# Patient Record
Sex: MALE | Race: WHITE | URBAN - METROPOLITAN AREA
[De-identification: names, ages, dates, MRNs, and addresses within clinical notes are randomized per-mention and may not be internally consistent; named-entity substitution may affect disease eponyms.]

---

## 2017-04-18 VITALS
BODY MASS INDEX: 25.28 KG/M2 | DIASTOLIC BLOOD PRESSURE: 67 MMHG | HEART RATE: 83 BPM | OXYGEN SATURATION: 94 % | SYSTOLIC BLOOD PRESSURE: 116 MMHG | RESPIRATION RATE: 18 BRPM | WEIGHT: 156.6 LBS | TEMPERATURE: 97.7 F

## 2017-04-18 RX ORDER — METRONIDAZOLE 10 MG/G
GEL TOPICAL DAILY PRN
COMMUNITY

## 2017-04-18 RX ORDER — LORATADINE 10 MG/1
10 TABLET ORAL DAILY
COMMUNITY

## 2017-04-18 RX ORDER — GUAIFENESIN 600 MG/1
1200 TABLET, EXTENDED RELEASE ORAL 2 TIMES DAILY
COMMUNITY
End: 2017-04-24

## 2017-04-19 ENCOUNTER — NURSING HOME VISIT (OUTPATIENT)
Dept: GERIATRICS | Facility: CLINIC | Age: 82
End: 2017-04-19
Payer: COMMERCIAL

## 2017-04-19 DIAGNOSIS — F03.90 DEMENTIA WITHOUT BEHAVIORAL DISTURBANCE, UNSPECIFIED DEMENTIA TYPE: ICD-10-CM

## 2017-04-19 DIAGNOSIS — K59.01 SLOW TRANSIT CONSTIPATION: ICD-10-CM

## 2017-04-19 DIAGNOSIS — I50.9 CHRONIC CONGESTIVE HEART FAILURE, UNSPECIFIED CONGESTIVE HEART FAILURE TYPE: ICD-10-CM

## 2017-04-19 DIAGNOSIS — J10.1 INFLUENZA B: Primary | ICD-10-CM

## 2017-04-19 DIAGNOSIS — R53.81 PHYSICAL DECONDITIONING: ICD-10-CM

## 2017-04-19 DIAGNOSIS — J98.4 PNEUMONITIS: ICD-10-CM

## 2017-04-19 DIAGNOSIS — J31.0 CHRONIC RHINITIS: ICD-10-CM

## 2017-04-19 PROCEDURE — 99207 ZZC CDG-CORRECTLY CODED, REVIEWED AND AGREE: CPT | Performed by: INTERNAL MEDICINE

## 2017-04-19 PROCEDURE — 99306 1ST NF CARE HIGH MDM 50: CPT | Performed by: INTERNAL MEDICINE

## 2017-04-19 NOTE — PROGRESS NOTES
[place]  INITIAL VISIT NOTE  April 19, 2017    PRIMARY CARE PROVIDER AND CLINIC:  Nicola Cohen [facility] [address] [facility] / [place] [facility]*    Chief Complaint   Patient presents with     Hospital F/U       HPI:    Christiano So is a 88 year old  (10/17/1928) male who was seen at [facility] on April 19, 2017 for an initial visit. Medical history is notable for dementia, chronic indwelling Forte and CHF. He was hospitalized at [facility] from 4/11/17 to 4/16/17 where he presented after a fall at his AL facility. He was found face down about two hours after dinner. Imaging negative for fractures. After admission developed increased lethargy and a fever and was positive for Influenza B. Treated for possible pneumonitis (CXR without infiltrate) and discharged to complete a course of levofloxacin. He was admitted to this facility for medical management and rehab.     Today, Mr. So is seen in his room. History is limited due to his dementia. Oriented to self only. Does not recall hospitalization. Working with therapies.     CODE STATUS:   CPR/Full code     ALLERGIES:     Allergies   Allergen Reactions     Asa [Aspirin] Rash     Breaks out on chest     Ibuprofen Rash       PAST MEDICAL HISTORY:   Past Medical History:   Diagnosis Date     Enlarged prostate with lower urinary tract symptoms (LUTS) 8/1/2013     Excessive cerumen in ear canal 1/9/2012     Family history of osteoporosis 1/9/2012     Hand fracture, right 1/9/2012     Impaired fasting blood sugar 1/9/2012     Lipoma 1960     OA (osteoarthritis) 1/9/2012     Onychomycosis 5/31/2013     PAC (premature atrial contraction) 1/9/2012     Venous stasis of lower extremity 5/31/2013       PAST SURGICAL HISTORY:   Past Surgical History:   Procedure Laterality Date     EYE SURGERY  7/2011    bilateral cataract.  [facility] [place]     HERNIA REPAIR, INGUINAL RT/LT  1960's     right       FAMILY HISTORY:   Family  History   Problem Relation Age of Onset     OSTEOPOROSIS Mother        SOCIAL HISTORY:   Lives in AL facility     MEDICATIONS:  Current Outpatient Prescriptions   Medication Sig Dispense Refill     loratadine (CLARITIN) 10 MG tablet Take 10 mg by mouth daily       CYANOCOBALAMIN PO Take 1,000 mcg by mouth daily       DIPHENHYDRAMINE HCL PO Take 25 mg by mouth daily as needed       DOCUSATE SODIUM PO Take 100 mg by mouth daily       guaiFENesin (MUCINEX) 600 MG 12 hr tablet Take 1,200 mg by mouth 2 times daily       LEVOFLOXACIN PO Take 750 mg by mouth daily       metroNIDAZOLE (METROGEL) 1 % gel Apply topically daily       ACETAMINOPHEN PO Take 650 mg by mouth every 6 hours as needed for pain        TORSEMIDE PO Take 10 mg by mouth daily       Potassium Chloride Valeria CR (K-DUR PO) Take 10 mEq by mouth daily       Cholecalciferol (VITAMIN D-3 PO) Take 2,000 Units by mouth daily         Post Discharge Medication Reconciliation Status: discharge medications reconciled, continue medications without change.    ROS:  Unable to obtain due to cognitive impairment or aphasia    PHYSICAL EXAM:  /67  Pulse 83  Temp 97.7  F (36.5  C)  Resp 18  Wt 156 lb 9.6 oz (71 kg)  SpO2 94%  BMI 25.28 kg/m2  Gen: sitting in wheelchair, alert, cooperative and in no acute distress  HEENT: normocephalic; oropharynx clear  Card: RRR, S1, S2, no murmurs  Resp: lungs clear to auscultation bilaterally, no crackles or wheezes  GI: abdomen soft, not-tender, non-distended  : Forte draining clear yellow urine  MSK: normal muscle tone, no LE edema  Neuro: CX II-XII grossly in tact; ROM in all four extremities grossly in tact  Psych:memory, judgement and insight impaired    LABORATORY/IMAGING DATA:  Reviewed as per Epic    ASSESSMENT/PLAN:    Influenza B, Resolved  Pneumonitis  Completed course of oseltamavir in hospital. Afebrile. Lungs clear on auscultation.   -- follow clinically  -- discharged to complete a 7 day course of levofloxacin  750 mg daily - no start or stop date -- will d/c after tomorrow's dose     Dementia Without Behavioral Disturbance  Oriented to self only. Lives in an AL facility.   -- OT following for cog assessment    CHF  Details not known. Appears euvolemic on exam. SBPs 110s-130s with limited data. Weights very labile and not reliable.   -- continues on torsemide 10 mg daily  -- follow BPs, weights, clinical volume status  -- [facility] 4/24    Chronic Rhinitis  -- continues on loratadine 10 mg daily    Slow Transit Constipation  -- continues on docusate 100 mg daily  -- adjust bowel regimen as needed    Physical Deconditioning  In setting of hospitalization and underlying medical conditions  -- ongoing PT/OT    Electronically signed by:  Eula Higuera MD

## 2017-04-24 ENCOUNTER — NURSING HOME VISIT (OUTPATIENT)
Dept: GERIATRICS | Facility: CLINIC | Age: 82
End: 2017-04-24
Payer: COMMERCIAL

## 2017-04-24 VITALS
BODY MASS INDEX: 25.47 KG/M2 | SYSTOLIC BLOOD PRESSURE: 113 MMHG | WEIGHT: 157.8 LBS | OXYGEN SATURATION: 96 % | RESPIRATION RATE: 18 BRPM | HEART RATE: 80 BPM | TEMPERATURE: 98.2 F | DIASTOLIC BLOOD PRESSURE: 66 MMHG

## 2017-04-24 DIAGNOSIS — N17.9 ACUTE KIDNEY INJURY (H): ICD-10-CM

## 2017-04-24 DIAGNOSIS — R53.81 PHYSICAL DECONDITIONING: ICD-10-CM

## 2017-04-24 DIAGNOSIS — J98.4 PNEUMONITIS: Primary | ICD-10-CM

## 2017-04-24 DIAGNOSIS — E87.6 HYPOKALEMIA: ICD-10-CM

## 2017-04-24 DIAGNOSIS — F03.90 DEMENTIA WITHOUT BEHAVIORAL DISTURBANCE, UNSPECIFIED DEMENTIA TYPE: ICD-10-CM

## 2017-04-24 DIAGNOSIS — J10.1 INFLUENZA B: ICD-10-CM

## 2017-04-24 DIAGNOSIS — K59.01 SLOW TRANSIT CONSTIPATION: ICD-10-CM

## 2017-04-24 DIAGNOSIS — I50.9 CONGESTIVE HEART FAILURE, UNSPECIFIED CONGESTIVE HEART FAILURE CHRONICITY, UNSPECIFIED CONGESTIVE HEART FAILURE TYPE: ICD-10-CM

## 2017-04-24 DIAGNOSIS — A04.72 C. DIFFICILE COLITIS: ICD-10-CM

## 2017-04-24 DIAGNOSIS — J31.0 CHRONIC RHINITIS: ICD-10-CM

## 2017-04-24 PROCEDURE — 99309 SBSQ NF CARE MODERATE MDM 30: CPT | Performed by: NURSE PRACTITIONER

## 2017-04-24 NOTE — PROGRESS NOTES
[place] [facility]    Chief Complaint   Patient presents with     Nursing Home Acute       HPI:    Christiano So is a 88 year old  (10/17/1928), who is being seen today for an episodic care visit at [facility]. Today's concern is:     Pneumonitis  Influenza B  Dementia without behavioral disturbance, unspecified dementia type  Congestive heart failure, unspecified congestive heart failure chronicity, unspecified congestive heart failure type (H)  Chronic rhinitis  C. 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  He was found down at his [facility] and brought to the ED, where he was found to have Influenza B and possible pneumonitis.  He was treated with Tamiflu and discharged to finish his levaquin course in TCU.      He is met today in his room where he denies SOB (endorses RAMIREZ, relieved by rest), CP, heartburn, nausea, fever/chills.  He notes he does not remember falling nor why he is admitted to the TCU.      Since last visit, nursing reported patient was having diarrhea and sample was sent for C Diff, returning positive.  He was put on metronidazole.     ALLERGIES: Asa [aspirin] and Ibuprofen  Past Medical, Surgical, Family and Social History reviewed and updated in [facility].    Current Outpatient Prescriptions   Medication Sig Dispense Refill     METRONIDAZOLE PO Take 500 mg by mouth 3 times daily For 10 days.       loratadine (CLARITIN) 10 MG tablet Take 10 mg by mouth daily       CYANOCOBALAMIN PO Take 1,000 mcg by mouth daily       DOCUSATE SODIUM PO Take 100 mg by mouth daily as needed        metroNIDAZOLE (METROGEL) 1 % gel Apply topically daily as needed        ACETAMINOPHEN PO Take 650 mg by mouth every 6 hours as needed for pain        TORSEMIDE PO Take 10 mg by mouth daily       Potassium  Chloride Valeria CR (K-DUR PO) Take 10 mEq by mouth daily       Cholecalciferol (VITAMIN D-3 PO) Take 2,000 Units by mouth daily       Medications reviewed:  Medications reconciled to facility chart and changes were made to reflect current medications as identified as above med list. Below are the changes that were made:   Medications stopped since last EPIC medication reconciliation:   There are no discontinued medications.    Medications started since last [facility] medication reconciliation:  No orders of the defined types were placed in this encounter.    REVIEW OF SYSTEMS:  Unobtainable secondary to cognitive impairment or aphasia, but today pt reports RAMIREZ (relieved by rest), no pain.     Physical Exam:  /66  Pulse 80  Temp 98.2  F (36.8  C)  Resp 18  Wt 157 lb 12.8 oz (71.6 kg)  SpO2 96%  BMI 25.47 kg/m2  GENERAL APPEARANCE:  Alert, in no distress, smiling, pleasant  RESP:  respiratory effort and palpation of chest normal, auscultation of lungs very diminished but seemingly clear, no respiratory distress, no cough during visit, on RA.   CV:  Palpation and auscultation of heart done , rate and rhythm regular, no murmur, no LE peripheral edema  ABDOMEN:  hyperactive bowel sounds, soft, nontender, no hepatosplenomegaly or other masses  M/S:   Gait and station with WC mobility, Digits and nails at baseline, reduced muscle mass  SKIN:  Inspection and Palpation of skin and subcutaneous tissue pale, thin, fragile  PSYCH:  insight and judgement, memory with impairment, affect and mood normal, follows commands readily if given one step at a time        Recent Labs:   4/21/2017  C. DIFFICILE TOXIN BY PCR Positive for  C. difficile  toxin by  PCR  No C. difficile  toxin detected  by PCR  A Final      Assessment/Plan:  Pneumonitis/Influenza B  Tx'd with Tamiflu in the [facility] finished in TCU, 4/20/17.   LS clear but very diminished today, no cough  Sats 92-98%, afebrile.   Will monitor.     Dementia without  behavioral disturbance, unspecified dementia type  Lives at [facility], daughter concerned about ability to return to [facility].  Awaiting cog scores from therapy    Congestive heart failure, unspecified congestive heart failure chronicity, unspecified congestive heart failure type (H)  3/6/15 ECHO with EF 60-65% with mild Pulm HTN  On Torsemide 10 mg daily  Weights stable:  Admit: 156.6  4/23/17 - 157.8    BPs 110-130/60-70s  HRs 70-80s    LS clear but very diminished, no LE edema.  Will monitor.     Chronic rhinitis  On loratadine 10 mg daily  (benadryl DC'd)  Appears in no distress  stable    C. difficile colitis  Recent hospital admission, antivirals and antibiotics recently  Metronidazole started 4/21/17 - 10 day course  Patient in enteric precautions.   Patient denies abdominal cramping.  Hyperactive BS today  Nursing noted 2 BM on evening shift yesterday, none noted NOC. (inconsistent/unknown specifics)  Will monitor for need to extend to 14 day course.     Hypokalemia  K today 3.0 - likely 2/2 diarrhea  Will give KCl 20 mEq po now, 20 mEq po tomorrow  Recheck BMP Wed, 4/26/17.     Acute kidney injury (H)  4/24/17 BMP:  BUN 26  Creat 1.36  GFR 49  Likely 2/2 C diff colitis diarrhea  Will push fluids and monitor with BMP on Wed.  May anticipate IVF if unsuccessful with po fluids.     Physical deconditioning  Physical Therapy/OT following for rehab.  Will monitor progress.  Speech Therapy downgraded diet today. Will monitor.     Slow transit constipation  With recent C Diff colitis - colace made PRN.  Will monitor.     Orders:  1. KCl 20 mEq po now. Dx: hypokalemia  2. KCl 20 mEq po tomorrow, 4/25/17 Dx: hypokalemia  3. BMP Wednesday, 4/26/17. Dx: MEET  4. Push fluids!     Electronically signed by  JENNIFER So CNP

## 2017-04-26 ENCOUNTER — TELEPHONE (OUTPATIENT)
Dept: GERIATRICS | Facility: CLINIC | Age: 82
End: 2017-04-26

## 2017-04-26 NOTE — TELEPHONE ENCOUNTER
BMP returned today: (4/24/17)    Na 145 - 143  K 2.9 - 3.0 - replaced  Cl 108 - 107  CO2 29 - 27  BUN 23 - 26  Creat 1.34 - 1.36  GFR 50 - 49  Glucose 141 - 92  Ca 8.7 - 8.7    Patient has C diff colitis with diarrhea.  4/24 - patient given KCl 20 mEq po daily x 2 day with recheck today.    Plan:   1. Encourage po fluids!  2. KCl 40 mEq now, then 20 mEq po BID tomorrow (Thursday, 4/27/17) and Friday (4/28/17).  3. BMP Fri, 4/28/17.     Lorri Harding, CNP

## 2017-04-28 ENCOUNTER — TELEPHONE (OUTPATIENT)
Dept: GERIATRICS | Facility: CLINIC | Age: 82
End: 2017-04-28

## 2017-04-28 NOTE — TELEPHONE ENCOUNTER
Previously replaced potassium for hypokalemia 2/2 C diff colitis.    History:  4/24 - patient given KCl 20 mEq po daily x 2 day   Recheck 4/26 - K 3.0  4/26 - KCl 40 mEq, then 20 mEq po BID Thursday, 4/27/17 and Friday 4/28/17.    BMP today with Na 144, K 3.6  No KCL replacement noted after tonight's dose.    Plan:  KCl 20 mEq po daily, starting 4/29/17 (tomorrow).    Recommend recheck K in one week or so.    Lorri Harding, CNP

## 2017-05-01 ENCOUNTER — NURSING HOME VISIT (OUTPATIENT)
Dept: GERIATRICS | Facility: CLINIC | Age: 82
End: 2017-05-01
Payer: COMMERCIAL

## 2017-05-01 VITALS
RESPIRATION RATE: 18 BRPM | HEART RATE: 72 BPM | SYSTOLIC BLOOD PRESSURE: 92 MMHG | DIASTOLIC BLOOD PRESSURE: 50 MMHG | TEMPERATURE: 98.2 F | OXYGEN SATURATION: 96 %

## 2017-05-01 DIAGNOSIS — K59.01 SLOW TRANSIT CONSTIPATION: ICD-10-CM

## 2017-05-01 DIAGNOSIS — J31.0 CHRONIC RHINITIS: ICD-10-CM

## 2017-05-01 DIAGNOSIS — F03.90 DEMENTIA WITHOUT BEHAVIORAL DISTURBANCE, UNSPECIFIED DEMENTIA TYPE: ICD-10-CM

## 2017-05-01 DIAGNOSIS — I50.9 CONGESTIVE HEART FAILURE, UNSPECIFIED CONGESTIVE HEART FAILURE CHRONICITY, UNSPECIFIED CONGESTIVE HEART FAILURE TYPE: ICD-10-CM

## 2017-05-01 DIAGNOSIS — R53.81 PHYSICAL DECONDITIONING: ICD-10-CM

## 2017-05-01 DIAGNOSIS — J98.4 PNEUMONITIS: Primary | ICD-10-CM

## 2017-05-01 DIAGNOSIS — A04.72 C. DIFFICILE COLITIS: ICD-10-CM

## 2017-05-01 DIAGNOSIS — J10.1 INFLUENZA B: ICD-10-CM

## 2017-05-01 PROCEDURE — 99309 SBSQ NF CARE MODERATE MDM 30: CPT | Performed by: NURSE PRACTITIONER

## 2017-05-01 NOTE — PROGRESS NOTES
[facility]    Chief Complaint   Patient presents with     RECHECK       HPI:    Christiano So is a 88 year old  (10/17/1928), who is being seen today for an episodic care visit at [facility]. Today's concern is:    Pneumonitis  Influenza B  Dementia without behavioral disturbance, unspecified dementia type  Congestive heart failure, unspecified congestive heart failure chronicity, unspecified congestive heart failure type (H)  Chronic rhinitis  C. 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  He was found down at his intermediate and brought to the ED, where he was found to have Influenza B and possible pneumonitis.  He was treated with Tamiflu and discharged to finish his levaquin course in TCU.      He is met today in his room where he denies SOB, CP, heartburn, nausea, fever/chills.      He endorses occasional abdominal pain.     Therapy report:  VANDANA 11/30, CPT 3.7/5.6  SBA with WC propulsion  Mod -Min assist with UB drsg,   Max assist for LB drsg, catheter cares, transfers  Ambulating 5-25 ft in parallel bars with assist x 1  Mod assist with bed mobility    ALLERGIES: Asa [aspirin] and Ibuprofen  Past Medical, Surgical, Family and Social History reviewed and updated in [facility].    Current Outpatient Prescriptions   Medication Sig Dispense Refill     METRONIDAZOLE PO Take 500 mg by mouth 3 times daily        loratadine (CLARITIN) 10 MG tablet Take 10 mg by mouth daily       CYANOCOBALAMIN PO Take 1,000 mcg by mouth daily       DOCUSATE SODIUM PO Take 100 mg by mouth daily as needed        metroNIDAZOLE (METROGEL) 1 % gel Apply topically daily as needed        ACETAMINOPHEN PO Take 650 mg by mouth every 6 hours as needed for pain        Potassium Chloride Valeria CR (K-DUR PO) Take 20 mEq by mouth  daily        Cholecalciferol (VITAMIN D-3 PO) Take 2,000 Units by mouth daily       Medications reviewed:  Medications reconciled to facility chart and changes were made to reflect current medications as identified as above med list. Below are the changes that were made:   Medications stopped since last EPIC medication reconciliation:   There are no discontinued medications.    Medications started since last [facility] medication reconciliation:  No orders of the defined types were placed in this encounter.    REVIEW OF SYSTEMS:  Unobtainable secondary to cognitive impairment or aphasia, but today pt reports, no pain, SOB, CP, heartburn,     Physical Exam:  BP 92/50  Pulse 72  Temp 98.2  F (36.8  C)  Resp 18  SpO2 96%  GENERAL APPEARANCE:  Alert, in no distress, smiling, pleasant  RESP:  respiratory effort and palpation of chest normal, auscultation of lungs very diminished but seemingly clear, no respiratory distress, no cough during visit, on RA.   CV:  Palpation and auscultation of heart done , rate and rhythm regular with mild ectopy, no murmur, no LE peripheral edema  ABDOMEN:  hyperactive bowel sounds, soft, nontender, no hepatosplenomegaly or other masses  M/S:   Gait and station with WC mobility, Digits and nails at baseline, reduced muscle mass  SKIN:  Inspection and Palpation of skin and subcutaneous tissue pale, thin, fragile  PSYCH:  insight and judgement, memory with impairment (SLUMS 11, CPT 3.7), affect and mood normal, follows commands readily if given one step at a time      Recent Labs:   4/28/2017  BASIC METAB PROFILE  SODIUM 144 mMol/L 136-145 Final  POTASSIUM 3.6 mMol/L 3.5-5.1 Final  CHLORIDE 108 mMol/L  Final  CARBON DIOXIDE 27 mMol/L 21-32 Final  BUN (UREA NITRO) 20 mg/dL 7-24 Final  CREATININE 1.40 mg/dL 0.70-1.30 H Final  EST GFR (MDRD) 48 mL/min >60 L Final  EST GFR IF AFRICAN AM 58 mL/min >60 L Final  GLUCOSE 135 mg/dL  H Final  CALCIUM, SERUM 8.7 mg/dL 8.5-10.1 Final  ANION GAP  9.0 mMol/L 0.0-15.0 Final    4/26/17  BASIC METAB PROFILE  SODIUM 145 mMol/L 136-145 Final  POTASSIUM 2.9 mMol/L 3.5-5.1 LL Final  Critical Result(s) Called at 14:09:18 04/26/2017 to and read back by shanon at [facility] to rl.  CHLORIDE 108 mMol/L  Final  CARBON DIOXIDE 29 mMol/L 21-32 Final  BUN (UREA NITRO) 23 mg/dL 7-24 Final  CREATININE 1.34 mg/dL 0.70-1.30 H Final  EST GFR (MDRD) 50 mL/min >60 L Final  EST GFR IF AFRICAN AM >60 mL/min >60 Final  GLUCOSE 141 mg/dL  H Final  CALCIUM, SERUM 8.7 mg/dL 8.5-10.1 Final  ANION GAP 8.0 mMol/L 0.0-15.0 Final    4/24/17  BASIC METAB PROFILE  SODIUM 143 mMol/L 136-145 Final  POTASSIUM 3.0 mMol/L 3.5-5.1 L Final  CHLORIDE 107 mMol/L  Final  CARBON DIOXIDE 27 mMol/L 21-32 Final  BUN (UREA NITRO) 26 mg/dL 7-24 H Final  CREATININE 1.36 mg/dL 0.70-1.30 H Final  EST GFR (MDRD) 49 mL/min >60 L Final  EST GFR IF AFRICAN AM 60 mL/min >60 L Final  GLUCOSE 92 mg/dL  Final  CALCIUM, SERUM 8.7 mg/dL 8.5-10.1 Final  ANION GAP 9.0 mMol/L 0.0-15.0 Final    4/21/2017  C. DIFFICILE TOXIN BY PCR Positive for  C. difficile  toxin by  PCR  No C. difficile  toxin detected  by PCR  A Final      Assessment/Plan:  Pneumonitis/Influenza B  Tx'd with Tamiflu in the hospital  [facility] finished in TCU, 4/20/17.   LS clear but very diminished today, no cough  Sats >94%, afebrile.   resolved    Dementia without behavioral disturbance, unspecified dementia type  Lives at [facility], daughter concerned about ability to return to [facility].  SLUMS 11/30  CPT 3.7/5.6      Congestive heart failure, unspecified congestive heart failure chronicity, unspecified congestive heart failure type (H)  3/6/15 ECHO with EF 60-65% with mild Pulm HTN  On Torsemide 10 mg daily  Weights stable:  Admit: 156.6  4/25 - 159.2    BPs /50-60s  HRs 80-90s    LS clear but very diminished, no LE edema.  Will DC Torsemide d/t MEET/diarrhea and hypotension    Chronic rhinitis  On loratadine 10 mg  daily  (benadryl DC'd)  Appears in no distress  stable    C. difficile colitis  Recent hospital admission, antivirals and antibiotics recently  Metronidazole started 4/21/17 - 10 day course - persistent diarrhea, will increase to 14 day course and monitor.     Patient in enteric precautions.   Patient endorses occasional abdominal cramping.  Hyperactive BS today      Hypokalemia  4/24 - patient given KCl 20 mEq po daily x 2 day   Recheck 4/26 - K 3.0  4/26 - KCl 40 mEq, then 20 mEq po BID Thursday, 4/27/17 and Friday 4/28/17.  4/28 - K 3.6 - started 20 mEq daily  Will order BMP for monitoring.     Acute kidney injury (H)  4/24/17 BMP/ 4/28/17 BMP  BUN 26 / 20  Creat 1.36 / 1.40  GFR 49 / 48  Likely 2/2 C diff colitis diarrhea  Will DC Torsemide and order BMP for monitoring.      Physical deconditioning  Physical Therapy/OT following for rehab.  Will monitor progress.  Speech Therapy downgraded diet today. Will monitor.  See above for progress from therapy.      Slow transit constipation  With recent C Diff colitis - colace made PRN.  Will monitor.     Orders:  1. Extend Metronidazole thru 5/5/17. Dx: C.Diff  2. Chart Qshift re:# of BMs and consistency of BMs on TAR. Dx: C.Diff  3. BMP 5/2/17. Dx: C.Diff, MEET, Hypokalemia  4. D/C Torsemide.    Electronically signed by  JENNIFER So CNP

## 2017-05-03 ENCOUNTER — NURSING HOME VISIT (OUTPATIENT)
Dept: GERIATRICS | Facility: CLINIC | Age: 82
End: 2017-05-03
Payer: COMMERCIAL

## 2017-05-03 VITALS
HEART RATE: 76 BPM | DIASTOLIC BLOOD PRESSURE: 51 MMHG | RESPIRATION RATE: 16 BRPM | SYSTOLIC BLOOD PRESSURE: 96 MMHG | TEMPERATURE: 97.6 F

## 2017-05-03 DIAGNOSIS — J10.1 INFLUENZA B: ICD-10-CM

## 2017-05-03 DIAGNOSIS — I50.9 CONGESTIVE HEART FAILURE, UNSPECIFIED CONGESTIVE HEART FAILURE CHRONICITY, UNSPECIFIED CONGESTIVE HEART FAILURE TYPE: ICD-10-CM

## 2017-05-03 DIAGNOSIS — F03.90 DEMENTIA WITHOUT BEHAVIORAL DISTURBANCE, UNSPECIFIED DEMENTIA TYPE: ICD-10-CM

## 2017-05-03 DIAGNOSIS — J98.4 PNEUMONITIS: Primary | ICD-10-CM

## 2017-05-03 DIAGNOSIS — A04.72 C. DIFFICILE COLITIS: ICD-10-CM

## 2017-05-03 DIAGNOSIS — R53.81 PHYSICAL DECONDITIONING: ICD-10-CM

## 2017-05-03 DIAGNOSIS — K59.01 SLOW TRANSIT CONSTIPATION: ICD-10-CM

## 2017-05-03 DIAGNOSIS — J31.0 CHRONIC RHINITIS: ICD-10-CM

## 2017-05-03 PROCEDURE — 99309 SBSQ NF CARE MODERATE MDM 30: CPT | Performed by: NURSE PRACTITIONER

## 2017-05-03 NOTE — PROGRESS NOTES
[place] [facility]    Chief Complaint   Patient presents with     RECHECK       HPI:    Christiano So is a 88 year old  (10/17/1928), who is being seen today for an episodic care visit at [facility]. Today's concern is:    Pneumonitis  Influenza B  Dementia without behavioral disturbance, unspecified dementia type  Congestive heart failure, unspecified congestive heart failure chronicity, unspecified congestive heart failure type (H)  Chronic rhinitis  C. 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  He was found down at his [facility] and brought to the ED, where he was found to have Influenza B and possible pneumonitis.  He was treated with Tamiflu and discharged to finish his levaquin course in TCU.      He is met today in his room where he denies SOB, CP, heartburn, nausea, fever/chills.  He endorses occasional abdominal pain and diarrhea but cannot describe how many episodes of diarrhea he is having per day.  Per nursing charting since Monday, it appears he is having roughly 1-2 loose BMs/day.     Therapy report:  SLUMS 11/30, CPT 3.7/5.6  SBA with WC propulsion  Mod assist with UB drsg,   Max assist for LB drsg, catheter cares, transfers  Ambulating 35 ft with 2WW  Tinetti 5  Recommendation is that patient look towards LTC placement (daughter is touring some LTC facilities)    ALLERGIES: Asa [aspirin] and Ibuprofen  Past Medical, Surgical, Family and Social History reviewed and updated in [facility].    Current Outpatient Prescriptions   Medication Sig Dispense Refill     METRONIDAZOLE PO Take 500 mg by mouth 3 times daily        loratadine (CLARITIN) 10 MG tablet Take 10 mg by mouth daily       CYANOCOBALAMIN PO Take 1,000 mcg by mouth daily       DOCUSATE SODIUM PO Take 100 mg by mouth daily as needed         metroNIDAZOLE (METROGEL) 1 % gel Apply topically daily as needed        ACETAMINOPHEN PO Take 650 mg by mouth every 6 hours as needed for pain        Potassium Chloride Valeria CR (K-DUR PO) Take 10 mEq by mouth daily        Cholecalciferol (VITAMIN D-3 PO) Take 2,000 Units by mouth daily       Medications reviewed:  Medications reconciled to facility chart and changes were made to reflect current medications as identified as above med list. Below are the changes that were made:   Medications stopped since last EPIC medication reconciliation:   There are no discontinued medications.    Medications started since last [facility] medication reconciliation:  No orders of the defined types were placed in this encounter.    REVIEW OF SYSTEMS:  Unobtainable secondary to cognitive impairment or aphasia, but today pt reports, no pain, SOB, CP, heartburn,     Physical Exam:  BP 96/51  Pulse 76  Temp 97.6  F (36.4  C)  Resp 16  GENERAL APPEARANCE:  Alert, in no distress, smiling, pleasant  RESP:  respiratory effort and palpation of chest normal, auscultation of lungs very diminished but seemingly clear, no respiratory distress, no cough during visit, on RA.   CV:  Palpation and auscultation of heart done , rate and rhythm regular with mild ectopy, no murmur, no LE peripheral edema  ABDOMEN:  Normal to hyperactive bowel sounds, soft, nontender, no hepatosplenomegaly or other masses  : Forte catheter in place with dark fani (concentrated) urine, no sediment, no clots.   M/S:   Gait and station with WC mobility, Digits and nails at baseline, reduced muscle mass  SKIN:  Inspection and Palpation of skin and subcutaneous tissue pale, thin, fragile  PSYCH:  insight and judgement, memory with impairment (SLUMS 11, CPT 3.7), affect and mood normal, follows commands readily if given one step at a time      Recent Labs:     5/2/2017  BASIC METAB PROFILE  SODIUM 144 mMol/L 136-145 Final  POTASSIUM 4.4 mMol/L 3.5-5.1 Final  CHLORIDE  109 mMol/L  Final  CARBON DIOXIDE 25 mMol/L 21-32 Final  BUN (UREA NITRO) 19 mg/dL 7-24 Final  CREATININE 1.25 mg/dL 0.70-1.30 Final  EST GFR (MDRD) 55 mL/min >60 L Final  EST GFR IF AFRICAN AM >60 mL/min >60 Final  GLUCOSE 105 mg/dL  Final  CALCIUM, SERUM 8.5 mg/dL 8.5-10.1 Final  ANION GAP 10.0 mMol/L 0.0-15.0 Final    4/28/2017  BASIC METAB PROFILE  SODIUM 144 mMol/L 136-145 Final  POTASSIUM 3.6 mMol/L 3.5-5.1 Final  CHLORIDE 108 mMol/L  Final  CARBON DIOXIDE 27 mMol/L 21-32 Final  BUN (UREA NITRO) 20 mg/dL 7-24 Final  CREATININE 1.40 mg/dL 0.70-1.30 H Final  EST GFR (MDRD) 48 mL/min >60 L Final  EST GFR IF AFRICAN AM 58 mL/min >60 L Final  GLUCOSE 135 mg/dL  H Final  CALCIUM, SERUM 8.7 mg/dL 8.5-10.1 Final  ANION GAP 9.0 mMol/L 0.0-15.0 Final    4/26/17  BASIC METAB PROFILE  SODIUM 145 mMol/L 136-145 Final  POTASSIUM 2.9 mMol/L 3.5-5.1 LL Final  Critical Result(s) Called at 14:09:18 04/26/2017 to and read back by shanon at [facility] to .  CHLORIDE 108 mMol/L  Final  CARBON DIOXIDE 29 mMol/L 21-32 Final  BUN (UREA NITRO) 23 mg/dL 7-24 Final  CREATININE 1.34 mg/dL 0.70-1.30 H Final  EST GFR (MDRD) 50 mL/min >60 L Final  EST GFR IF AFRICAN AM >60 mL/min >60 Final  GLUCOSE 141 mg/dL  H Final  CALCIUM, SERUM 8.7 mg/dL 8.5-10.1 Final  ANION GAP 8.0 mMol/L 0.0-15.0 Final    4/24/17  BASIC METAB PROFILE  SODIUM 143 mMol/L 136-145 Final  POTASSIUM 3.0 mMol/L 3.5-5.1 L Final  CHLORIDE 107 mMol/L  Final  CARBON DIOXIDE 27 mMol/L 21-32 Final  BUN (UREA NITRO) 26 mg/dL 7-24 H Final  CREATININE 1.36 mg/dL 0.70-1.30 H Final  EST GFR (MDRD) 49 mL/min >60 L Final  EST GFR IF AFRICAN AM 60 mL/min >60 L Final  GLUCOSE 92 mg/dL  Final  CALCIUM, SERUM 8.7 mg/dL 8.5-10.1 Final  ANION GAP 9.0 mMol/L 0.0-15.0 Final    4/21/2017  C. DIFFICILE TOXIN BY PCR Positive for  C. difficile  toxin by  PCR  No C. difficile  toxin detected  by PCR  A  Final      Assessment/Plan:  Pneumonitis/Influenza B  Tx'd with Tamiflu in the hospital  [facility] finished in TCU, 4/20/17.   LS clear but very diminished today, no cough  Sats >94%, afebrile.   resolved    Dementia without behavioral disturbance, unspecified dementia type  Lives at [facility], daughter touring LT facilities for post-DC.  SLUMS 11/30  CPT 3.7/5.6      Congestive heart failure, unspecified congestive heart failure chronicity, unspecified congestive heart failure type (H)  3/6/15 ECHO with EF 60-65% with mild Pulm HTN  Torsemide 10 mg daily - DC'd 5/1/17  On no other BP meds    Weights stable:  None done since 4/25 - will order again.     BPs 92-96/50s  HRs 76-80    LS clear but very diminished, no LE edema.      Chronic rhinitis  On loratadine 10 mg daily  (benadryl DC'd)  Appears in no distress  stable    C. difficile colitis  Recent hospital admission, antivirals and antibiotics recently  Metronidazole started 4/21/17 - 10 day course extended to 14 day course (done 5/5/17)    Per charting, patient having 1-2 loose BMs/day.  Patient unable to comment but reports ocassional abdomen pain.    Patient in enteric precautions.   Will monitor.    Hypokalemia  4/24 - patient given KCl 20 mEq po daily x 2 day   Recheck 4/26 - K 3.0  4/26 - KCl 40 mEq, then 20 mEq po BID Thursday, 4/27/17 and Friday 4/28/17.  4/28 - K 3.6 - started 20 mEq daily  5/2/17 - K 4.4    Will decrease KCl to 10 mEq daily and anticipate DC in near future once diarrhea is slowed/resolved.    Acute kidney injury (H)  4/24/17 BMP/ 4/28/17 BMP/ 5/2/17  BUN 26 / 20 / 19  Creat 1.36 / 1.40 / 1.25  GFR 49 / 48 / 55  Likely 2/2 C diff colitis diarrhea  Torsemide DC'd     resolving      Physical deconditioning  Physical Therapy/OT following for rehab.  Will monitor progress.  Speech Therapy downgraded diet today. Will monitor.  See above for progress from therapy.      Slow transit constipation  With recent C Diff colitis - colace made PRN.  Will  monitor.     Orders:  1. Decrease KCL to 10meq po daily. Dx: Hypokalemia  2. BMP Mon 5/8/2017. Dx: MEET, Hyokalemia  3. Daily Weights Dx: CHF    Electronically signed by  JENNIFER So CNP

## 2017-05-08 ENCOUNTER — NURSING HOME VISIT (OUTPATIENT)
Dept: GERIATRICS | Facility: CLINIC | Age: 82
End: 2017-05-08
Payer: COMMERCIAL

## 2017-05-08 VITALS
DIASTOLIC BLOOD PRESSURE: 62 MMHG | RESPIRATION RATE: 18 BRPM | SYSTOLIC BLOOD PRESSURE: 100 MMHG | TEMPERATURE: 97.8 F | HEART RATE: 67 BPM | WEIGHT: 160 LBS | OXYGEN SATURATION: 94 % | BODY MASS INDEX: 25.82 KG/M2

## 2017-05-08 DIAGNOSIS — F03.90 DEMENTIA WITHOUT BEHAVIORAL DISTURBANCE, UNSPECIFIED DEMENTIA TYPE: ICD-10-CM

## 2017-05-08 DIAGNOSIS — J31.0 CHRONIC RHINITIS: ICD-10-CM

## 2017-05-08 DIAGNOSIS — R53.81 PHYSICAL DECONDITIONING: ICD-10-CM

## 2017-05-08 DIAGNOSIS — J98.4 PNEUMONITIS: Primary | ICD-10-CM

## 2017-05-08 DIAGNOSIS — I50.9 CONGESTIVE HEART FAILURE, UNSPECIFIED CONGESTIVE HEART FAILURE CHRONICITY, UNSPECIFIED CONGESTIVE HEART FAILURE TYPE: ICD-10-CM

## 2017-05-08 DIAGNOSIS — K59.01 SLOW TRANSIT CONSTIPATION: ICD-10-CM

## 2017-05-08 DIAGNOSIS — A04.72 C. DIFFICILE COLITIS: ICD-10-CM

## 2017-05-08 DIAGNOSIS — J10.1 INFLUENZA B: ICD-10-CM

## 2017-05-08 PROCEDURE — 99309 SBSQ NF CARE MODERATE MDM 30: CPT | Performed by: NURSE PRACTITIONER

## 2017-05-08 NOTE — PROGRESS NOTES
[place] [facility]    Chief Complaint   Patient presents with     RECHECK       HPI:    Christiano So is a 88 year old  (10/17/1928), who is being seen today for an episodic care visit at [facility]. Today's concern is:    Pneumonitis  Influenza B  Dementia without behavioral disturbance, unspecified dementia type  Congestive heart failure, unspecified congestive heart failure chronicity, unspecified congestive heart failure type (H)  Chronic rhinitis  C. difficile colitis  Slow transit constipation  Physical deconditioning     Patient is met today in his room where he denies SOB, CP, heartburn, nausea, fever/chills.  He endorses occasional abdominal pain and diarrhea but cannot describe how many episodes of diarrhea he is having per day.  Per nursing charting he is having 2-3 loose/soft stools per day (5/5/17 - 6 stools).     Therapy report:  VANDANA 11/30, CPT 3.7/5.6  SBA with WC propulsion  Mod assist with UB drsg,   Max assist for LB drsg, catheter cares, transfers  Ambulating 35 ft with 2WW  Tinetti 5  Recommendation is that patient look towards LTC placement (daughter is touring some LTC facilities)    ALLERGIES: Asa [aspirin] and Ibuprofen  Past Medical, Surgical, Family and Social History reviewed and updated in [facility].    Current Outpatient Prescriptions   Medication Sig Dispense Refill     loratadine (CLARITIN) 10 MG tablet Take 10 mg by mouth daily       CYANOCOBALAMIN PO Take 1,000 mcg by mouth daily       DOCUSATE SODIUM PO Take 100 mg by mouth daily as needed        metroNIDAZOLE (METROGEL) 1 % gel Apply topically daily as needed        ACETAMINOPHEN PO Take 650 mg by mouth every 6 hours as needed for pain        Potassium Chloride Valeria CR (K-DUR PO) Take 10 mEq by mouth daily        Cholecalciferol (VITAMIN D-3 PO) Take 2,000 Units by mouth daily       Medications reviewed:  Medications reconciled to facility chart and changes were made to reflect current medications as identified as  above med list. Below are the changes that were made:   Medications stopped since last EPIC medication reconciliation:   There are no discontinued medications.    Medications started since last [facility] medication reconciliation:  No orders of the defined types were placed in this encounter.    REVIEW OF SYSTEMS:  Unobtainable secondary to cognitive impairment or aphasia, but today pt reports, no pain, SOB, CP, heartburn,     Physical Exam:  /62  Pulse 67  Temp 97.8  F (36.6  C)  Resp 18  Wt 160 lb (72.6 kg)  SpO2 94%  BMI 25.82 kg/m2  GENERAL APPEARANCE:  Alert, in no distress, smiling, pleasant  RESP:  respiratory effort and palpation of chest normal, auscultation of lungs clear, no respiratory distress, no cough during visit, on RA.   CV:  Palpation and auscultation of heart done , rate and rhythm regular, no murmur, no LE peripheral edema  ABDOMEN:  Normal bowel sounds, soft, nontender, no hepatosplenomegaly or other masses  : Forte catheter in place with dark fani (concentrated) urine, no sediment, no clots.   M/S:   Gait and station with WC mobility, Digits and nails at baseline, reduced muscle mass  SKIN:  Inspection and Palpation of skin and subcutaneous tissue pale, thin, fragile  PSYCH:  insight and judgement, memory with impairment (SLUMS 11, CPT 3.7), affect and mood normal, follows commands readily if given one step at a time      Recent Labs:   5/8/17  BASIC METAB PROFILE  SODIUM 144 mMol/L 136-145 Final  POTASSIUM 3.7 mMol/L 3.5-5.1 Final  CHLORIDE 110 mMol/L  Final  CARBON DIOXIDE 27 mMol/L 21-32 Final  BUN (UREA NITRO) 10 mg/dL 7-24 Final  CREATININE 1.08 mg/dL 0.70-1.30 Final  EST GFR (MDRD) >60 mL/min >60 Final  EST GFR IF AFRICAN AM >60 mL/min >60 Final  GLUCOSE 122 mg/dL  H Final  CALCIUM, SERUM 8.1 mg/dL 8.5-10.1 L Final  ANION GAP 7.0 mMol/L 0.0-15.0 Final    5/2/2017  BASIC METAB PROFILE  SODIUM 144 mMol/L 136-145 Final  POTASSIUM 4.4 mMol/L 3.5-5.1 Final  CHLORIDE 109  mMol/L  Final  CARBON DIOXIDE 25 mMol/L 21-32 Final  BUN (UREA NITRO) 19 mg/dL 7-24 Final  CREATININE 1.25 mg/dL 0.70-1.30 Final  EST GFR (MDRD) 55 mL/min >60 L Final  EST GFR IF AFRICAN AM >60 mL/min >60 Final  GLUCOSE 105 mg/dL  Final  CALCIUM, SERUM 8.5 mg/dL 8.5-10.1 Final  ANION GAP 10.0 mMol/L 0.0-15.0 Final    4/28/2017  BASIC METAB PROFILE  SODIUM 144 mMol/L 136-145 Final  POTASSIUM 3.6 mMol/L 3.5-5.1 Final  CHLORIDE 108 mMol/L  Final  CARBON DIOXIDE 27 mMol/L 21-32 Final  BUN (UREA NITRO) 20 mg/dL 7-24 Final  CREATININE 1.40 mg/dL 0.70-1.30 H Final  EST GFR (MDRD) 48 mL/min >60 L Final  EST GFR IF AFRICAN AM 58 mL/min >60 L Final  GLUCOSE 135 mg/dL  H Final  CALCIUM, SERUM 8.7 mg/dL 8.5-10.1 Final  ANION GAP 9.0 mMol/L 0.0-15.0 Final    4/21/2017  C. DIFFICILE TOXIN BY PCR Positive for  C. difficile  toxin by  PCR  No C. difficile  toxin detected  by PCR  A Final      Assessment/Plan:  Pneumonitis/Influenza B  Tx'd with Tamiflu in the [facility] finished in TCU, 4/20/17.   LS clear today, no cough  Sats >94%, afebrile.   Patient denies SOB, cough  resolved    Dementia without behavioral disturbance, unspecified dementia type  Lives at [facility], [facility] LT facilities for post-DC.  [facility] 11/30  CPT 3.7/5.6      Congestive heart failure, unspecified congestive heart failure chronicity, unspecified congestive heart failure type (H)  3/6/15 ECHO with EF 60-65% with mild Pulm HTN  Torsemide 10 mg daily - DC'd 5/1/17  On no other BP meds    Weights stable:  5/4 - 158.2  5/5 - 160  5/6 - 160.3  5/7 - 160     BPs 90-100s/60s  HRs 67-90    LS clear, no LE edema. - no CHF exacerbation  Patient denies CP      Chronic rhinitis  On loratadine 10 mg daily  (benadryl DC'd)  Appears in no distress  stable    C. difficile colitis  Recent hospital admission, antivirals and antibiotics recently  Metronidazole started 4/21/17 - 10 day course extended to 14 day course (done 5/5/17)    Per  charting patient noted to have 2-3 loose, soft stools per day (one day with 6 stools).  Patient endorses occasional abdominal pain. Will order recheck on c diff and if negative, can start po fiber.  Nursing to please encourage po fluids intake.     Hypokalemia  4/24 - patient given KCl 20 mEq po daily x 2 day   Recheck 4/26 - K 3.0  4/26 - KCl 40 mEq, then 20 mEq po BID Thursday, 4/27/17 and Friday 4/28/17.  4/28 - K 3.6 - started 20 mEq daily  5/2/17 - K 4.4  KCl reduced to 10 mEq daily.   5/8/17 - K 3.7    Will monitor.     Acute kidney injury (H)  4/24 -> 4/28 -> 5/2 -> 5/8  BUN 26 -> 20 ->19 -> 10  Creat 1.36 -> 1.40 -> 1.25 -> 1.08  GFR 49 -> 48 -> 55 -> >60  Likely 2/2 C diff colitis diarrhea  Torsemide DC'd     Seemingly resolved    Physical deconditioning  Physical Therapy/OT following for rehab.  Will monitor progress.  Speech Therapy downgraded diet today. Will monitor.  See above for progress from therapy.      Slow transit constipation  With recent C Diff colitis - colace made PRN.  Will monitor.     Orders:  1. Stool sample to eval for C. Diff. Dx: Diarrhea  2. When/if test results as negative, 1 tbsp metamucil po daily. Dx: Diarrhea.  3. Encourage fluids    Electronically signed by  JENNIFER So CNP

## 2017-05-10 ENCOUNTER — NURSING HOME VISIT (OUTPATIENT)
Dept: GERIATRICS | Facility: CLINIC | Age: 82
End: 2017-05-10
Payer: COMMERCIAL

## 2017-05-10 VITALS
HEART RATE: 78 BPM | OXYGEN SATURATION: 94 % | DIASTOLIC BLOOD PRESSURE: 58 MMHG | TEMPERATURE: 98.6 F | SYSTOLIC BLOOD PRESSURE: 108 MMHG | WEIGHT: 159.6 LBS | RESPIRATION RATE: 18 BRPM | BODY MASS INDEX: 25.76 KG/M2

## 2017-05-10 DIAGNOSIS — J31.0 CHRONIC RHINITIS: ICD-10-CM

## 2017-05-10 DIAGNOSIS — I50.9 CONGESTIVE HEART FAILURE, UNSPECIFIED CONGESTIVE HEART FAILURE CHRONICITY, UNSPECIFIED CONGESTIVE HEART FAILURE TYPE: ICD-10-CM

## 2017-05-10 DIAGNOSIS — J10.1 INFLUENZA B: ICD-10-CM

## 2017-05-10 DIAGNOSIS — A04.72 C. DIFFICILE COLITIS: ICD-10-CM

## 2017-05-10 DIAGNOSIS — K59.01 SLOW TRANSIT CONSTIPATION: ICD-10-CM

## 2017-05-10 DIAGNOSIS — J98.4 PNEUMONITIS: Primary | ICD-10-CM

## 2017-05-10 DIAGNOSIS — R53.81 PHYSICAL DECONDITIONING: ICD-10-CM

## 2017-05-10 DIAGNOSIS — F03.90 DEMENTIA WITHOUT BEHAVIORAL DISTURBANCE, UNSPECIFIED DEMENTIA TYPE: ICD-10-CM

## 2017-05-10 PROCEDURE — 99309 SBSQ NF CARE MODERATE MDM 30: CPT | Performed by: NURSE PRACTITIONER

## 2017-05-10 PROCEDURE — 99207 ZZC CDG-CORRECTLY CODED, REVIEWED AND AGREE: CPT | Performed by: NURSE PRACTITIONER

## 2017-05-10 NOTE — PROGRESS NOTES
[place] [facility]    Chief Complaint   Patient presents with     RECHECK       HPI:    Christiano So is a 88 year old  (10/17/1928), who is being seen today for an episodic care visit at [facility]. Today's concern is:    Pneumonitis  Influenza B  Dementia without behavioral disturbance, unspecified dementia type  Congestive heart failure, unspecified congestive heart failure chronicity, unspecified congestive heart failure type (H)  Chronic rhinitis  C. difficile colitis  Slow transit constipation  Physical deconditioning     Patient is met today in his room where he denies SOB, CP, heartburn, nausea, fever/chills.  He endorses occasional abdominal pain and diarrhea but cannot describe how many episodes of diarrhea he is having per day. C diff test was positive yesterday and patient was started on vancomycin po antibiotics.     Therapy report:  LANUMS 11/30, CPT 3.7/5.6  Min to Mod assist with drsg,   Mod assist with transfers  Max assist for  catheter cares, toileting  Ambulating 10-70 ft with platform walker  Tinetti 5    ALLERGIES: Asa [aspirin] and Ibuprofen  Past Medical, Surgical, Family and Social History reviewed and updated in [facility].    Current Outpatient Prescriptions   Medication Sig Dispense Refill     psyllium (METAMUCIL) 58.6 % POWD 1 tbsp orally one time a day for diarrhea       Vancomycin HCl (VANCOCIN HCL PO) Take 125 mg by mouth 4 times daily Until 5/23/2017       loratadine (CLARITIN) 10 MG tablet Take 10 mg by mouth daily       CYANOCOBALAMIN PO Take 1,000 mcg by mouth daily       DOCUSATE SODIUM PO Take 100 mg by mouth daily as needed        metroNIDAZOLE (METROGEL) 1 % gel Apply topically daily as needed        ACETAMINOPHEN PO Take 650 mg by mouth every 6 hours as needed for pain        Potassium Chloride Valeria CR (K-DUR PO) Take 10 mEq by mouth daily        Cholecalciferol (VITAMIN D-3 PO) Take 2,000 Units by mouth daily       Medications reviewed:  Medications reconciled to  facility chart and changes were made to reflect current medications as identified as above med list. Below are the changes that were made:   Medications stopped since last EPIC medication reconciliation:   There are no discontinued medications.    Medications started since last [facility] medication reconciliation:  No orders of the defined types were placed in this encounter.    REVIEW OF SYSTEMS:  Unobtainable secondary to cognitive impairment or aphasia, but today pt reports, no pain, SOB, CP, heartburn,     Physical Exam:  /58  Pulse 78  Temp 98.6  F (37  C)  Resp 18  Wt 159 lb 9.6 oz (72.4 kg)  SpO2 94%  BMI 25.76 kg/m2  GENERAL APPEARANCE:  Alert, in no distress, smiling, pleasant  RESP:  respiratory effort and palpation of chest normal, auscultation of lungs clear, no respiratory distress, no cough during visit, on RA.   CV:  Palpation and auscultation of heart done , rate and rhythm regular, no murmur, no LE peripheral edema  ABDOMEN:  Normal bowel sounds, soft, nontender, no hepatosplenomegaly or other masses  : Forte catheter in place with dark fani (concentrated) urine, no sediment, no clots.   M/S:   Gait and station with WC mobility, Digits and nails at baseline, reduced muscle mass  SKIN:  Inspection and Palpation of skin and subcutaneous tissue pale, thin, fragile  PSYCH:  insight and judgement, memory with impairment (SLUMS 11, CPT 3.7), affect and mood normal, follows commands readily if given one step at a time      Recent Labs:   5/8/17  C. DIFFICILE TOXIN BY PCR Positive for  C. difficile  toxin by  PCR  No C. difficile  toxin detected  by PCR  A Final    5/8/17  BASIC METAB PROFILE  SODIUM 144 mMol/L 136-145 Final  POTASSIUM 3.7 mMol/L 3.5-5.1 Final  CHLORIDE 110 mMol/L  Final  CARBON DIOXIDE 27 mMol/L 21-32 Final  BUN (UREA NITRO) 10 mg/dL 7-24 Final  CREATININE 1.08 mg/dL 0.70-1.30 Final  EST GFR (MDRD) >60 mL/min >60 Final  EST GFR IF AFRICAN AM >60 mL/min >60 Final  GLUCOSE  122 mg/dL  H Final  CALCIUM, SERUM 8.1 mg/dL 8.5-10.1 L Final  ANION GAP 7.0 mMol/L 0.0-15.0 Final    5/2/2017  BASIC METAB PROFILE  SODIUM 144 mMol/L 136-145 Final  POTASSIUM 4.4 mMol/L 3.5-5.1 Final  CHLORIDE 109 mMol/L  Final  CARBON DIOXIDE 25 mMol/L 21-32 Final  BUN (UREA NITRO) 19 mg/dL 7-24 Final  CREATININE 1.25 mg/dL 0.70-1.30 Final  EST GFR (MDRD) 55 mL/min >60 L Final  EST GFR IF AFRICAN AM >60 mL/min >60 Final  GLUCOSE 105 mg/dL  Final  CALCIUM, SERUM 8.5 mg/dL 8.5-10.1 Final  ANION GAP 10.0 mMol/L 0.0-15.0 Final    4/28/2017  BASIC METAB PROFILE  SODIUM 144 mMol/L 136-145 Final  POTASSIUM 3.6 mMol/L 3.5-5.1 Final  CHLORIDE 108 mMol/L  Final  CARBON DIOXIDE 27 mMol/L 21-32 Final  BUN (UREA NITRO) 20 mg/dL 7-24 Final  CREATININE 1.40 mg/dL 0.70-1.30 H Final  EST GFR (MDRD) 48 mL/min >60 L Final  EST GFR IF AFRICAN AM 58 mL/min >60 L Final  GLUCOSE 135 mg/dL  H Final  CALCIUM, SERUM 8.7 mg/dL 8.5-10.1 Final  ANION GAP 9.0 mMol/L 0.0-15.0 Final    4/21/2017  C. DIFFICILE TOXIN BY PCR Positive for  C. difficile  toxin by  PCR  No C. difficile  toxin detected  by PCR  A Final      Assessment/Plan:  Pneumonitis/Influenza B  Tx'd with Tamiflu in the [facility] finished in U, 4/20/17.   LS clear today, no cough  Sats >94%, afebrile.   Patient denies SOB, cough  resolved    Dementia without behavioral disturbance, unspecified dementia type  [facility] from [facility] to [facility] from [facility].  [facility] 11/30  CPT 3.7/5.6      Congestive heart failure, unspecified congestive heart failure chronicity, unspecified congestive heart failure type (H)  3/6/15 ECHO with EF 60-65% with mild Pulm HTN  Torsemide 10 mg daily - DC'd 5/1/17  On no other BP meds    Weights stable:  5/4 - 158.2  5/5 - 160  5/6 - 160.3  5/7 - 160   5/8 - 160.4  5/9 - 162.4  5/10 - 159.6    BPs 100s/50-60s  HRs 67-86    LS clear, no LE edema. - no CHF exacerbation  Patient denies CP      Chronic rhinitis  On  loratadine 10 mg daily  (benadryl DC'd)  Appears in no distress  stable    C. difficile colitis  Recent hospital admission, antivirals and antibiotics recently  Metronidazole started 4/21/17 - 10 day course extended to 14 day course (done 5/5/17).  5/9/17 - positive stool culture for C Diff - started on Vancomycin 125 mg QID - will add end date to telephone order given yesterday (14 day course).     Hypokalemia  4/24 - patient given KCl 20 mEq po daily x 2 day   Recheck 4/26 - K 3.0  4/26 - KCl 40 mEq, then 20 mEq po BID Thursday, 4/27/17 and Friday 4/28/17.  4/28 - K 3.6 - started 20 mEq daily  5/2/17 - K 4.4  KCl reduced to 10 mEq daily.   5/8/17 - K 3.7    Will monitor. With continued C Diff infection/diarhea, will monitor.     Acute kidney injury (H)  4/24 -> 4/28 -> 5/2 -> 5/8  BUN 26 -> 20 ->19 -> 10  Creat 1.36 -> 1.40 -> 1.25 -> 1.08  GFR 49 -> 48 -> 55 -> >60  Likely 2/2 C diff colitis diarrhea  Torsemide DC'd     Seemingly resolved. With continued C Diff infection/diarhea, will monitor.     Physical deconditioning  Physical Therapy/OT following for rehab.  Will monitor progress.  Speech Therapy downgraded diet today. Will monitor.  See above for progress from therapy.      Slow transit constipation  With recent C Diff colitis - colace made PRN.  Will monitor.     Spoke with patient's daughter over the phone and gave update on condition/DC planning.     Orders:  1. Clarification: Vancomycin po 125mg QID x 14 days. (end date 5/23/2017). Dx: CDiff  2. BMP Fri, 5/12/17. Dx: Diarrhea, MEET    Electronically signed by  JENNIFER So CNP

## 2017-05-12 ENCOUNTER — NURSING HOME VISIT (OUTPATIENT)
Dept: GERIATRICS | Facility: CLINIC | Age: 82
End: 2017-05-12
Payer: COMMERCIAL

## 2017-05-12 VITALS
OXYGEN SATURATION: 94 % | TEMPERATURE: 96.4 F | RESPIRATION RATE: 18 BRPM | WEIGHT: 159 LBS | DIASTOLIC BLOOD PRESSURE: 51 MMHG | SYSTOLIC BLOOD PRESSURE: 104 MMHG | BODY MASS INDEX: 25.66 KG/M2 | HEART RATE: 73 BPM

## 2017-05-12 DIAGNOSIS — J10.1 INFLUENZA B: ICD-10-CM

## 2017-05-12 DIAGNOSIS — I50.9 CONGESTIVE HEART FAILURE, UNSPECIFIED CONGESTIVE HEART FAILURE CHRONICITY, UNSPECIFIED CONGESTIVE HEART FAILURE TYPE: ICD-10-CM

## 2017-05-12 DIAGNOSIS — J98.4 PNEUMONITIS: Primary | ICD-10-CM

## 2017-05-12 DIAGNOSIS — F03.90 DEMENTIA WITHOUT BEHAVIORAL DISTURBANCE, UNSPECIFIED DEMENTIA TYPE: ICD-10-CM

## 2017-05-12 DIAGNOSIS — R53.81 PHYSICAL DECONDITIONING: ICD-10-CM

## 2017-05-12 DIAGNOSIS — K59.01 SLOW TRANSIT CONSTIPATION: ICD-10-CM

## 2017-05-12 DIAGNOSIS — J31.0 CHRONIC RHINITIS: ICD-10-CM

## 2017-05-12 DIAGNOSIS — A04.72 C. DIFFICILE COLITIS: ICD-10-CM

## 2017-05-12 PROCEDURE — 99309 SBSQ NF CARE MODERATE MDM 30: CPT | Performed by: NURSE PRACTITIONER

## 2017-05-12 NOTE — PROGRESS NOTES
[facility]    Chief Complaint   Patient presents with     RECHECK       HPI:    Christiano So is a 88 year old  (10/17/1928), who is being seen today for an episodic care visit at [facility]. Today's concern is:    Pneumonitis  Influenza B  Dementia without behavioral disturbance, unspecified dementia type  Congestive heart failure, unspecified congestive heart failure chronicity, unspecified congestive heart failure type (H)  Chronic rhinitis  C. difficile colitis  Slow transit constipation  Physical deconditioning     Patient is met today in his room where he denies SOB, CP, heartburn, nausea, fever/chills.  He endorses occasional abdominal pain and diarrhea but cannot describe how many episodes of diarrhea he is having per day. C diff test was again positive recently and patient was started on vancomycin po antibiotics.     Therapy report:  LANUMS 11/30, CPT 3.7/5.6  Min to Mod assist with drsg,   Mod assist with transfers  Max assist for  catheter cares, toileting  Ambulating 10-70 ft with platform walker  Tinetti 5    ALLERGIES: Asa [aspirin] and Ibuprofen  Past Medical, Surgical, Family and Social History reviewed and updated in [facility].    Current Outpatient Prescriptions   Medication Sig Dispense Refill     psyllium (METAMUCIL) 58.6 % POWD 1 tbsp orally one time a day for diarrhea       Vancomycin HCl (VANCOCIN HCL PO) Take 125 mg by mouth 4 times daily Until 5/23/2017       loratadine (CLARITIN) 10 MG tablet Take 10 mg by mouth daily       CYANOCOBALAMIN PO Take 1,000 mcg by mouth daily       DOCUSATE SODIUM PO Take 100 mg by mouth daily as needed        metroNIDAZOLE (METROGEL) 1 % gel Apply topically daily as needed        ACETAMINOPHEN PO Take 650 mg by mouth every 6 hours as needed for pain        Potassium Chloride Valeria CR (K-DUR PO) Take 10 mEq by mouth daily        Cholecalciferol (VITAMIN D-3 PO) Take 2,000 Units by mouth daily       Medications reviewed:  Medications reconciled  to facility chart and changes were made to reflect current medications as identified as above med list. Below are the changes that were made:   Medications stopped since last EPIC medication reconciliation:   There are no discontinued medications.    Medications started since last [facility] medication reconciliation:  No orders of the defined types were placed in this encounter.    REVIEW OF SYSTEMS:  Unobtainable secondary to cognitive impairment or aphasia, but today pt reports, no pain, SOB, CP, heartburn,     Physical Exam:  /51  Pulse 73  Temp 96.4  F (35.8  C)  Resp 18  Wt 159 lb (72.1 kg)  SpO2 94%  BMI 25.66 kg/m2  GENERAL APPEARANCE:  Alert, in no distress, smiling, pleasant  RESP:  respiratory effort and palpation of chest normal, auscultation of lungs clear, no respiratory distress, no cough during visit, on RA.   CV:  Palpation and auscultation of heart done , rate and rhythm irregular, no murmur, no LE peripheral edema  ABDOMEN:  Normal bowel sounds, soft, nontender, no hepatosplenomegaly or other masses  : Forte catheter in place with yellow, clear urine, no sediment, no clots (improved from previous dark fani)  M/S:   Gait and station with WC mobility, Digits and nails at baseline, reduced muscle mass  SKIN:  Inspection and Palpation of skin and subcutaneous tissue pale, thin, fragile  PSYCH:  insight and judgement, memory with impairment (SLUMS 11, CPT 3.7), affect and mood normal, follows commands readily if given one step at a time      Recent Labs:   BASIC METAB PROFILE  SODIUM 145 mMol/L 136-145 Final  POTASSIUM 3.5 mMol/L 3.5-5.1 Final  CHLORIDE 111 mMol/L  Final  CARBON DIOXIDE 26 mMol/L 21-32 Final  BUN (UREA NITRO) 11 mg/dL 7-24 Final  CREATININE 1.00 mg/dL 0.70-1.30 Final  EST GFR (MDRD) >60 mL/min >60 Final  EST GFR IF AFRICAN AM >60 mL/min >60 Final  GLUCOSE 111 mg/dL  H Final  CALCIUM, SERUM 8.6 mg/dL 8.5-10.1 Final  ANION GAP 8.0 mMol/L 0.0-15.0  Final      5/8/17  C. DIFFICILE TOXIN BY PCR Positive for  C. difficile  toxin by  PCR  No C. difficile  toxin detected  by PCR  A Final    5/8/17  BASIC METAB PROFILE  SODIUM 144 mMol/L 136-145 Final  POTASSIUM 3.7 mMol/L 3.5-5.1 Final  CHLORIDE 110 mMol/L  Final  CARBON DIOXIDE 27 mMol/L 21-32 Final  BUN (UREA NITRO) 10 mg/dL 7-24 Final  CREATININE 1.08 mg/dL 0.70-1.30 Final  EST GFR (MDRD) >60 mL/min >60 Final  EST GFR IF AFRICAN AM >60 mL/min >60 Final  GLUCOSE 122 mg/dL  H Final  CALCIUM, SERUM 8.1 mg/dL 8.5-10.1 L Final  ANION GAP 7.0 mMol/L 0.0-15.0 Final    5/2/2017  BASIC METAB PROFILE  SODIUM 144 mMol/L 136-145 Final  POTASSIUM 4.4 mMol/L 3.5-5.1 Final  CHLORIDE 109 mMol/L  Final  CARBON DIOXIDE 25 mMol/L 21-32 Final  BUN (UREA NITRO) 19 mg/dL 7-24 Final  CREATININE 1.25 mg/dL 0.70-1.30 Final  EST GFR (MDRD) 55 mL/min >60 L Final  EST GFR IF AFRICAN AM >60 mL/min >60 Final  GLUCOSE 105 mg/dL  Final  CALCIUM, SERUM 8.5 mg/dL 8.5-10.1 Final  ANION GAP 10.0 mMol/L 0.0-15.0 Final    4/28/2017  BASIC METAB PROFILE  SODIUM 144 mMol/L 136-145 Final  POTASSIUM 3.6 mMol/L 3.5-5.1 Final  CHLORIDE 108 mMol/L  Final  CARBON DIOXIDE 27 mMol/L 21-32 Final  BUN (UREA NITRO) 20 mg/dL 7-24 Final  CREATININE 1.40 mg/dL 0.70-1.30 H Final  EST GFR (MDRD) 48 mL/min >60 L Final  EST GFR IF AFRICAN AM 58 mL/min >60 L Final  GLUCOSE 135 mg/dL  H Final  CALCIUM, SERUM 8.7 mg/dL 8.5-10.1 Final  ANION GAP 9.0 mMol/L 0.0-15.0 Final    4/21/2017  C. DIFFICILE TOXIN BY PCR Positive for  C. difficile  toxin by  PCR  No C. difficile  toxin detected  by PCR  A Final      Assessment/Plan:  C. difficile colitis  Recent hospital admission, antivirals and antibiotics recently  Metronidazole started 4/21/17 - 5/5/17 (14 day course).  5/9/17 - positive stool culture for C Diff - started on Vancomycin 125 mg QID (end date 5/23/17 after last dose that day).     EHR noting 2-4 BMs/day, loose  Patient reports  occasional abdominal pain    Hypokalemia  Likely 2/2 diarrhea  (Torsemide DC'd)  4/24 - patient given KCl 20 mEq po daily x 2 day   Recheck 4/26 - K 3.0  4/26 - KCl 40 mEq, then 20 mEq po BID Thursday, 4/27/17 and Friday 4/28/17.  4/28 - K 3.6 - started 20 mEq daily  5/2/17 - K 4.4  KCl reduced to 10 mEq daily.   5/8/17 - K 3.7  5/12/17 - K 3.5 - will increase to 20 mEq daily.         Acute kidney injury (H)  4/24 -> 4/28 -> 5/2 -> 5/8 --5/12  BUN 26 -> 20 ->19 -> 10 --> 11  Creat 1.36 -> 1.40 -> 1.25 -> 1.08 --> 1.00  GFR 49 -> 48 -> 55 -> >60 --> >60  Likely 2/2 C diff colitis diarrhea  Torsemide DC'd     Seemingly resolved. With continued C Diff infection/diarhea, will monitor.     Pneumonitis/Influenza B  Reason for TCU admission.   Tx'd with Tamiflu in the [facility] finished in TCU, 4/20/17.   LS clear today, no cough  Sats >94%, afebrile.   Patient denies SOB, cough  resolved    Dementia without behavioral disturbance, unspecified dementia type  Moving from [facility] to [facility] upon DC from TCU.  [facility] 11/30  CPT 3.7/5.6  Able to make needs known.  Supportive cares from SNF staff      Congestive heart failure, unspecified congestive heart failure chronicity, unspecified congestive heart failure type (H)  3/6/15 ECHO with EF 60-65% with mild Pulm HTN  Torsemide 10 mg daily - DC'd 5/1/17  On no other BP meds    Weights stable:  5/4 - 158.2  5/5 - 160  5/6 - 160.3  5/7 - 160   5/8 - 160.4  5/9 - 162.4  5/10 - 159.6  5/11 - 159.0  5/12 - 164.8    BPs 100s/50-60s  HRs 65-86    LS clear, no LE edema. - no CHF exacerbation  Patient denies CP  Will monitor weights but likely today's weight inaccurate.       Chronic rhinitis  On loratadine 10 mg daily  (benadryl DC'd)  Appears in no distress  stable    Physical deconditioning  Physical Therapy/OT following for rehab.  Will monitor progress.  Speech Therapy downgraded diet today. Will monitor.  See above for progress from therapy.      Slow transit  constipation  With recent C Diff colitis - colace made PRN.  Will monitor.     Orders:  1. Increase KCL to 20 mEq daily  2. Recheck K level Tuesday, 5/16/17. Dx: hypokalemia      Electronically signed by  JENNIFER So CNP

## 2017-05-16 ENCOUNTER — DISCHARGE SUMMARY NURSING HOME (OUTPATIENT)
Dept: GERIATRICS | Facility: CLINIC | Age: 82
End: 2017-05-16
Payer: COMMERCIAL

## 2017-05-16 VITALS
SYSTOLIC BLOOD PRESSURE: 100 MMHG | HEART RATE: 78 BPM | WEIGHT: 160 LBS | BODY MASS INDEX: 25.82 KG/M2 | RESPIRATION RATE: 18 BRPM | DIASTOLIC BLOOD PRESSURE: 53 MMHG | TEMPERATURE: 97.1 F | OXYGEN SATURATION: 96 %

## 2017-05-16 DIAGNOSIS — A04.72 C. DIFFICILE COLITIS: Primary | ICD-10-CM

## 2017-05-16 DIAGNOSIS — I50.9 CONGESTIVE HEART FAILURE, UNSPECIFIED CONGESTIVE HEART FAILURE CHRONICITY, UNSPECIFIED CONGESTIVE HEART FAILURE TYPE: ICD-10-CM

## 2017-05-16 DIAGNOSIS — F03.90 DEMENTIA WITHOUT BEHAVIORAL DISTURBANCE, UNSPECIFIED DEMENTIA TYPE: ICD-10-CM

## 2017-05-16 DIAGNOSIS — K59.01 SLOW TRANSIT CONSTIPATION: ICD-10-CM

## 2017-05-16 DIAGNOSIS — J31.0 CHRONIC RHINITIS: ICD-10-CM
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
## 2017-05-16 DIAGNOSIS — J98.4 PNEUMONITIS: ICD-10-CM

## 2017-05-16 DIAGNOSIS — E87.6 HYPOKALEMIA: ICD-10-CM

## 2017-05-16 DIAGNOSIS — N17.9 AKI (ACUTE KIDNEY INJURY) (H): ICD-10-CM

## 2017-05-16 DIAGNOSIS — N31.2 ATONIC BLADDER: ICD-10-CM

## 2017-05-16 DIAGNOSIS — R53.81 PHYSICAL DECONDITIONING: ICD-10-CM

## 2017-05-16 DIAGNOSIS — J10.1 INFLUENZA B: ICD-10-CM

## 2017-05-16 PROCEDURE — 99316 NF DSCHRG MGMT 30 MIN+: CPT | Performed by: NURSE PRACTITIONER

## 2017-05-16 PROCEDURE — 99207 ZZC CDG-CORRECTLY CODED, REVIEWED AND AGREE: CPT | Performed by: NURSE PRACTITIONER

## 2017-05-16 NOTE — PROGRESS NOTES
[facility] DISCHARGE SUMMARY    PATIENT'S NAME: Christiano So  YOB: 1928  MEDICAL RECORD NUMBER:  [id number]    PRIMARY CARE PROVIDER AND CLINIC RESPONSIBLE AFTER TRANSFER: Nicola [facility] [address] / [place], [place]    CODE STATUS/ADVANCE DIRECTIVES DISCUSSION:   DNR / DNI       Allergies   Allergen Reactions     Asa [Aspirin] Rash     Breaks out on chest     Ibuprofen Rash       TRANSFERRING PROVIDERS: JENNIFER So CNP, Eula Higuera MD  DATE OF SNF ADMISSION:  April / 16 / 2017  DATE OF SNF (anticipated) DISCHARGE: May / 18 / 2017  DISCHARGE DISPOSITION: Assisted Living: [facility]   Nursing Facility: [facility] stay 04/11/2017 to 04/16/2017.     Condition on Discharge:  Improving.  Function:  Min to mod assist with drsg, mod assist with transfers, max assist for catheter cares, toileting, ambulating 10-70 ft with platform walker, Tinetti 5  Cognitive Scores: SLUMS 11/30 and CPT 3.7/5.6    Equipment: wheelchair    DISCHARGE DIAGNOSIS:   1. C. difficile colitis    2. Hypokalemia    3. MEET (acute kidney injury) (H)    4. Pneumonitis    5. Influenza B    6. Dementia without behavioral disturbance, unspecified dementia type    7. Congestive heart failure, unspecified congestive heart failure chronicity, unspecified congestive heart failure type (H)    8. Chronic rhinitis    9. Slow transit constipation    10. Physical deconditioning    11. Fall, initial encounter    from MD note upon TCU admission:  Christiano So is a 88 year old (10/17/1928) male who was seen at [facility] TCU on April 19, 2017 for an initial visit. Medical history is notable for dementia, chronic indwelling Forte and CHF. He was hospitalized at [facility] from 4/11/17 to 4/16/17 where he presented after a fall at his AL facility. He was found face down about two hours after dinner. Imaging negative for fractures. After  admission developed increased lethargy and a fever and was positive for Influenza B. Treated for possible pneumonitis (CXR without infiltrate) and discharged to complete a course of levofloxacin. He was admitted to this facility for medical management and rehab.     [facility] Nursing Facility Course:  Patient initially admitted s/p fall at his [facility] and was found ot have Influenza B and pneumonitis.  Early in TCU stay he had diarrhea which was positive for C Diff, treated with metronidazole.  Persistent (mild) diarrhea resulted with positive C Diff cx and was then put on po vancomycin (outdates 5/23/17).  Subsequent hypokalemia was treated with KCl replacement and monitoring.  Subsequent MEET is resolved with DC of Torsemide.       C. difficile colitis  Recent hospital admission, antivirals and antibiotics recently  Metronidazole started 4/21/17 - 5/5/17 (14 day course).  5/9/17 - positive stool culture for C Diff - started on Vancomycin 125 mg QID (end date 5/23/17 after last dose that day).      EHR noting 1-4 BMs/day now, more formed  Patient denies abdominal pain  BS normal today (not hyperactive)    Hypokalemia  Likely 2/2 diarrhea  (Torsemide DC'd)  4/24 - patient given KCl 20 mEq po daily x 2 day   Recheck 4/26 - K 3.0  4/26 - KCl 40 mEq, then 20 mEq po BID Thursday, 4/27/17 and Friday 4/28/17.  4/28 - K 3.6 - started 20 mEq daily  5/2/17 - K 4.4  KCl reduced to 10 mEq daily.   5/8/17 - K 3.7  5/12/17 - K 3.5 - will increase to 20 mEq daily.  5/16/17 - K 4.0    MEET (acute kidney injury) (H)  4/24 -> 4/28 -> 5/2 -> 5/8 --5/12  BUN 26 -> 20 ->19 -> 10 --> 11  Creat 1.36 -> 1.40 -> 1.25 -> 1.08 --> 1.00  GFR 49 -> 48 -> 55 -> >60 --> >60  Likely 2/2 C diff colitis diarrhea  Torsemide DC'd      Seemingly resolved. With continued C Diff infection/diarhea, recommend continued monitoring.    Pneumonitis/Influenza B  Reason for TCU admission.   Tx'd with Tamiflu in the [facility] finished in TCU, 4/20/17.   LS clear  "today, no cough  Sats 92-96%, afebrile.   Patient denies SOB, cough  resolved    Dementia without behavioral disturbance, unspecified dementia type  Moving from [facility] to [facility] upon DC from TCU.  SLUMS 11/30  CPT 3.7/5.6  Able to make needs known.    Atonic bladder/LUTZ  History of urinary retention - was seen by Urology in the past.  Had been on Flomax and Proscar unsuccessfully in the past  Now has chronic indwelling Forte - last changed 5/4/17.  Urine clear, yellow, today without sediment or clots    Congestive heart failure, unspecified congestive heart failure chronicity, unspecified congestive heart failure type (H)  3/6/15 ECHO with EF 60-65% with mild Pulm HTN  Torsemide 10 mg daily - DC'd 5/1/17  On no other BP meds    Weights stable round 160 lb.   LS clear  Scant LE edema, no TEDS in place.     BPs 100-120/50-60s  HR 62-78    Chronic rhinitis  On loratadine 10 mg daily  (benadryl DC'd)  Appears in no distress  stable    Slow transit constipation  With recent C Diff colitis - colace made PRN.    Physical deconditioning  Physical Therapy and Occupational Therapy in rehab - will DC with Home Physical Therapy and Occupational Therapy.  Min to Mod assist with drsg,   Mod assist with transfers  Max assist for catheter cares, toileting  Ambulating 10-70 ft with platform walker - WC mobility usually  Tinetti 5    Fall, initial encounter  Initial reason for EMS call at [facility]  Fall 05/16/17 (today) in TCU with left flank and right post thigh abrasions.  Patient noted, \"I know I'm not supposed to transfer myself, but I did.\"   Neuros intact, VS stable.         PAST MEDICAL HISTORY:  has a past medical history of Enlarged prostate with lower urinary tract symptoms (LUTS) (8/1/2013); Excessive cerumen in ear canal (1/9/2012); Family history of osteoporosis (1/9/2012); Hand fracture, right (1/9/2012); Impaired fasting blood sugar (1/9/2012); Lipoma (1960); OA (osteoarthritis) (1/9/2012); Onychomycosis (5/31/2013); PAC " (premature atrial contraction) (1/9/2012); and Venous stasis of lower extremity (5/31/2013).    DISCHARGE MEDICATIONS:  Current Outpatient Prescriptions   Medication Sig Dispense Refill     psyllium (METAMUCIL) 58.6 % POWD 1 tbsp orally one time a day for diarrhea       Vancomycin HCl (VANCOCIN HCL PO) Take 125 mg by mouth 4 times daily Until 5/23/2017       loratadine (CLARITIN) 10 MG tablet Take 10 mg by mouth daily       CYANOCOBALAMIN PO Take 1,000 mcg by mouth daily       DOCUSATE SODIUM PO Take 100 mg by mouth daily as needed        metroNIDAZOLE (METROGEL) 1 % gel Apply topically daily as needed        ACETAMINOPHEN PO Take 650 mg by mouth every 6 hours as needed for pain        Potassium Chloride Valeria CR (K-DUR PO) Take 20 mEq by mouth daily        Cholecalciferol (VITAMIN D-3 PO) Take 2,000 Units by mouth daily         MEDICATION CHANGES/RATIONALE:   - Levaquin finished in TCU, 4/20/17  - benadryl DC'd upon TCU admission ([facility])  - Torsemide DC'd d/t MEET 2/2 dehydration from diarrhea  - KCl added and now on 20 mEq daily 2/2 diarrhea  - Metronidazole 4/21/17 - 5/5/17  - PO vancomycin 5/9/17 - 5/23/17  - colace made PRN 2/2 diarrhea    Controlled medications sent with patient: not applicable/none     ROS:    10 point ROS of systems including Constitutional, Eyes, Respiratory, Cardiovascular, Gastroenterology, Genitourinary, Integumentary, Muscularskeletal, Psychiatric were all negative except for pertinent positives noted in my HPI.    Physical Exam:   Vitals: /53  Pulse 78  Temp 97.1  F (36.2  C)  Resp 18  Wt 160 lb (72.6 kg)  SpO2 96%  BMI 25.82 kg/m2  BMI= Body mass index is 25.82 kg/(m^2).    GENERAL APPEARANCE:  Alert, in no distress, smiling  RESP:  respiratory effort and palpation of chest normal, auscultation of lungs clear, no respiratory distress  CV:  Palpation and auscultation of heart done , rate and rhythm regular with intermittent ectopy, no murmur, scant LE peripheral  edema  ABDOMEN:  normal bowel sounds, soft, nontender, no hepatosplenomegaly or other masses  M/S:   Gait and station with WC mobility mmost, Digits and nails with arthritic changes/baseline, reduced muscle mass  SKIN:  Inspection and Palpation of skin and subcutaneous tissue pale, some AK on face, fragile, thin,   PSYCH:  insight and judgement, memory with impairment , affect and mood normal, follows commands readily         DISCHARGE PLAN:  Occupational Therapy, Physical Therapy and Registered Nurse  Patient instructed to follow-up with:  PCP in 7 days      Current [place] scheduled appointments:  Future Appointments  Date Time Provider Department Center   5/16/2017 11:30 AM Lorri Harding, APRN CNP [facility]       MTM referral needed and placed by this provider: No    Pending labs: None   SNF labs   5/16/17  POTASSIUM 4.0 mMol/L 3.5-5.1 Final    5/12/17  BASIC METAB PROFILE  SODIUM 145 mMol/L 136-145 Final  POTASSIUM 3.5 mMol/L 3.5-5.1 Final  CHLORIDE 111 mMol/L  Final  CARBON DIOXIDE 26 mMol/L 21-32 Final  BUN (UREA NITRO) 11 mg/dL 7-24 Final  CREATININE 1.00 mg/dL 0.70-1.30 Final  EST GFR (MDRD) >60 mL/min >60 Final  EST GFR IF AFRICAN AM >60 mL/min >60 Final  GLUCOSE 111 mg/dL  H Final  CALCIUM, SERUM 8.6 mg/dL 8.5-10.1 Final  ANION GAP 8.0 mMol/L 0.0-15.0 Final      5/8/17  C. DIFFICILE TOXIN BY PCR Positive for  C. difficile  toxin by  PCR  No C. difficile  toxin detected  by PCR  A Final    5/8/17  BASIC METAB PROFILE  SODIUM 144 mMol/L 136-145 Final  POTASSIUM 3.7 mMol/L 3.5-5.1 Final  CHLORIDE 110 mMol/L  Final  CARBON DIOXIDE 27 mMol/L 21-32 Final  BUN (UREA NITRO) 10 mg/dL 7-24 Final  CREATININE 1.08 mg/dL 0.70-1.30 Final  EST GFR (MDRD) >60 mL/min >60 Final  EST GFR IF AFRICAN AM >60 mL/min >60 Final  GLUCOSE 122 mg/dL  H Final  CALCIUM, SERUM 8.1 mg/dL 8.5-10.1 L Final  ANION GAP 7.0 mMol/L 0.0-15.0 Final    5/2/2017  BASIC METAB PROFILE  SODIUM 144 mMol/L  136-145 Final  POTASSIUM 4.4 mMol/L 3.5-5.1 Final  CHLORIDE 109 mMol/L  Final  CARBON DIOXIDE 25 mMol/L 21-32 Final  BUN (UREA NITRO) 19 mg/dL 7-24 Final  CREATININE 1.25 mg/dL 0.70-1.30 Final  EST GFR (MDRD) 55 mL/min >60 L Final  EST GFR IF AFRICAN AM >60 mL/min >60 Final  GLUCOSE 105 mg/dL  Final  CALCIUM, SERUM 8.5 mg/dL 8.5-10.1 Final  ANION GAP 10.0 mMol/L 0.0-15.0 Final    4/28/2017  BASIC METAB PROFILE  SODIUM 144 mMol/L 136-145 Final  POTASSIUM 3.6 mMol/L 3.5-5.1 Final  CHLORIDE 108 mMol/L  Final  CARBON DIOXIDE 27 mMol/L 21-32 Final  BUN (UREA NITRO) 20 mg/dL 7-24 Final  CREATININE 1.40 mg/dL 0.70-1.30 H Final  EST GFR (MDRD) 48 mL/min >60 L Final  EST GFR IF AFRICAN AM 58 mL/min >60 L Final  GLUCOSE 135 mg/dL  H Final  CALCIUM, SERUM 8.7 mg/dL 8.5-10.1 Final  ANION GAP 9.0 mMol/L 0.0-15.0 Final    4/21/2017  C. DIFFICILE TOXIN BY PCR Positive for  C. difficile  toxin by  PCR  No C. difficile  toxin detected  by PCR  A Final  Discharge Treatments:   - Recommend following K level as diarrhea resolves.    - Recommend freq weights to monitor CHF without Torsemide  - Catheter change q4 weeks (last changed 5/4/17)    TOTAL DISCHARGE TIME:   Greater than 30 minutes  Electronically signed by:  JENNIFER So CNP

## 2018-05-25 ENCOUNTER — TELEPHONE (OUTPATIENT)
Dept: FAMILY MEDICINE | Facility: CLINIC | Age: 83
End: 2018-05-25

## 2018-05-25 NOTE — TELEPHONE ENCOUNTER
"It appears patient has not been seen by Dr. Cohen or anyone in primary care in  system since 7/25/14.    Assume he has another PCP this request should go to.    I called Fernanda with home care at number listed, left message on voicemail identified as \"Cali\" requesting call back to [facility] RN line at [phone] to discuss, does patient have another PCP?    Erika Galdamez, FELICITY  [facility]      "

## 2018-05-25 NOTE — TELEPHONE ENCOUNTER
Reason for Call: Request for an order or referral:    Order or referral being requested: skilled nursin visit / 2 months for catheter care    Date needed: as soon as possible    Has the patient been seen by the PCP for this problem? YES    Additional comments: Please call Home Care nurse Fernanda with verbal order    Phone number Patient can be reached at:  Other phone number:  [phone]*    Best Time:  asap    Can we leave a detailed message on this number?  YES    Call taken on 2018 at 4:46 PM by Lisandro Parkinson

## 2018-05-29 NOTE — TELEPHONE ENCOUNTER
Called Fernanda and asked if patient has another PCP since he has not been seen since 2014. Fernanda stated she was not sure and will need to ask patient's main RN. She will call back to let us know if he see's someone else or if he needs to come in to see Dr. Cohen.     Varsha Mosqueda, RN  [facility]

## 2018-06-04 NOTE — TELEPHONE ENCOUNTER
Called Fernanda and she stated that this message has been taken care of by another provider.  Sharon Graves RN CPC Triage.

## 2018-06-04 NOTE — TELEPHONE ENCOUNTER
Called Fernanda at   [phone] no answer left message to call nurse line.    Muna Nunn RN  [facility]

## 2018-06-04 NOTE — TELEPHONE ENCOUNTER
Fernanda returned call to RN line and left message for call back to her at [phone].  Erika Galdamez RN  [facility]